# Patient Record
(demographics unavailable — no encounter records)

---

## 2025-04-23 NOTE — HISTORY OF PRESENT ILLNESS
[de-identified] : Jerrica Hartley is a 53-year-old female presenting for grade 2 spondylolisthesis at S1 with severe bilateral neuroforaminal stenosis consistent with her symptoms. Patient is accompanied by her sister. Patient has been primarily bedridden for the past 8 months. Patient has no change in bladder or doug function and no perineal numbness.

## 2025-04-23 NOTE — ADDENDUM
[FreeTextEntry1] :  IDeena assisted in documentation on 04/22/2025 acting as a scribe for Dr. Richi Tuttle.

## 2025-04-23 NOTE — PHYSICAL EXAM
[Cane] : ambulates with cane [Other: ___] : [unfilled] [de-identified] : Hip abductor weakness.   Severe deformity leaving patient bent forward.

## 2025-04-23 NOTE — HISTORY OF PRESENT ILLNESS
[de-identified] : Jerrica Hartley is a 53-year-old female presenting for grade 2 spondylolisthesis at S1 with severe bilateral neuroforaminal stenosis consistent with her symptoms. Patient is accompanied by her sister. Patient has been primarily bedridden for the past 8 months. Patient has no change in bladder or doug function and no perineal numbness.

## 2025-04-23 NOTE — PLAN
[TextEntry] : Patient has failed conservative treatment, including activity modification, pain management, medication, epidural steroid injections, physiotherapy, chiropractic treatment, and acupuncture.        Based on severity of patient's symptoms and radiographs, she is anxious to pursue decompression and fusion at L4 to pelvis.         The risks, benefits, and alternatives were explained to the patient in detail as well as her sister.      The risks include, but are not limited to, persistent symptoms, pseudoarthrosis, worsening symptoms, epidural hematoma, dural tear, infection, need for reoperation, persistent or worsening numbness, tingling, pins and needs, pain, paralysis, nerve injury, bleeding, heart attack, stroke, pulmonary embolism, DVT, exacerbation of her rheumatoid arthritis, and other untoward issues including infection, wound dehiscence, and other.     Unfortunately, patient has a recurrent brain tumor that requires reoperation. This will be her third surgery.    Once cleared by neurosurgeon at [facility], patient will be operated on quickly thereafter.

## 2025-04-23 NOTE — END OF VISIT
[FreeTextEntry3] : . All medical record entries made by my scribe were at my, Dr. Richi Tuttle, direction and personally dictated by me. I have reviewed the chart and agree that the record accurately reflects my personal performance of the history, physical exam, assessment and plan. I have also personally directed, reviewed, and agreed with the chart.

## 2025-04-23 NOTE — HISTORY OF PRESENT ILLNESS
[de-identified] : Jerrica Hartley is a 53-year-old female presenting for grade 2 spondylolisthesis at S1 with severe bilateral neuroforaminal stenosis consistent with her symptoms. Patient is accompanied by her sister. Patient has been primarily bedridden for the past 8 months. Patient has no change in bladder or doug function and no perineal numbness.

## 2025-04-23 NOTE — PHYSICAL EXAM
[Cane] : ambulates with cane [Other: ___] : [unfilled] [de-identified] : Hip abductor weakness.   Severe deformity leaving patient bent forward.

## 2025-04-23 NOTE — PLAN
[TextEntry] : Patient has failed conservative treatment, including activity modification, pain management, medication, epidural steroid injections, physiotherapy, chiropractic treatment, and acupuncture.        Based on severity of patient's symptoms and radiographs, she is anxious to pursue decompression and fusion at L4 to pelvis.         The risks, benefits, and alternatives were explained to the patient in detail as well as her sister.      The risks include, but are not limited to, persistent symptoms, pseudoarthrosis, worsening symptoms, epidural hematoma, dural tear, infection, need for reoperation, persistent or worsening numbness, tingling, pins and needs, pain, paralysis, nerve injury, bleeding, heart attack, stroke, pulmonary embolism, DVT, exacerbation of her rheumatoid arthritis, and other untoward issues including infection, wound dehiscence, and other.     Unfortunately, patient has a recurrent brain tumor that requires reoperation. This will be her third surgery.    Once cleared by neurosurgeon at [facility], patient will be operated on quickly thereafter.

## 2025-04-23 NOTE — PHYSICAL EXAM
[Cane] : ambulates with cane [Other: ___] : [unfilled] [de-identified] : Hip abductor weakness.   Severe deformity leaving patient bent forward.